# Patient Record
Sex: FEMALE | Race: OTHER | NOT HISPANIC OR LATINO | URBAN - METROPOLITAN AREA
[De-identification: names, ages, dates, MRNs, and addresses within clinical notes are randomized per-mention and may not be internally consistent; named-entity substitution may affect disease eponyms.]

---

## 2023-02-23 ENCOUNTER — EMERGENCY (EMERGENCY)
Facility: HOSPITAL | Age: 68
LOS: 1 days | Discharge: HOME | End: 2023-02-23
Attending: EMERGENCY MEDICINE
Payer: MEDICARE

## 2023-02-23 VITALS
DIASTOLIC BLOOD PRESSURE: 72 MMHG | HEART RATE: 86 BPM | RESPIRATION RATE: 18 BRPM | TEMPERATURE: 99 F | SYSTOLIC BLOOD PRESSURE: 121 MMHG | OXYGEN SATURATION: 96 % | WEIGHT: 100.09 LBS

## 2023-02-23 VITALS
SYSTOLIC BLOOD PRESSURE: 131 MMHG | OXYGEN SATURATION: 99 % | DIASTOLIC BLOOD PRESSURE: 83 MMHG | TEMPERATURE: 99 F | HEART RATE: 82 BPM | RESPIRATION RATE: 18 BRPM

## 2023-02-23 PROCEDURE — 73562 X-RAY EXAM OF KNEE 3: CPT

## 2023-02-23 PROCEDURE — 99283 EMERGENCY DEPT VISIT LOW MDM: CPT

## 2023-02-23 PROCEDURE — 99284 EMERGENCY DEPT VISIT MOD MDM: CPT

## 2023-02-23 PROCEDURE — 73562 X-RAY EXAM OF KNEE 3: CPT | Mod: 26,RT

## 2023-02-23 RX ORDER — IBUPROFEN 200 MG
600 TABLET ORAL ONCE
Refills: 0 | Status: COMPLETED | OUTPATIENT
Start: 2023-02-23 | End: 2023-02-23

## 2023-02-23 RX ADMIN — Medication 600 MILLIGRAM(S): at 16:14

## 2023-02-23 NOTE — ED PROVIDER NOTE - PHYSICAL EXAMINATION
Exam:  General: Patient well appearing, vital signs within normal limits  HENT: airway patent with moist mucous membranes, slight bruising over L maxilla without palpable deformity  Eye: EOMI  Cardiac: RRR S1/S2 with strong peripheral pulses  Respiratory: lungs clear without respiratory distress  GI: abdomen soft, non tender, non distended  Neuro: no gross neurologic deficits, strength and sensation intact  MSK: R knee bruising with effusion, ROM slightly decreased , no obvious deformity, no other noted extremity injuries  Skin: warm, well perfused  Psych: normal mood and affect

## 2023-02-23 NOTE — ED PROVIDER NOTE - OBJECTIVE STATEMENT
Patient presenting complaining of right knee pain after mechanical fall.  She was a crossing the street when she tripped and fell forward catching her weight on her knees.  She did strike her left cheek lightly and is having some pain over a bruise over her cheekbone.  She does not have any loss of consciousness and is not on any blood thinners.  She was able to stand and ambulate with assistance but is having trouble bearing weight on her right knee.  She is denying any visual changes any sudden severe headaches any nauseousness or vomiting any numbness tingling or weakness.  She has no pain in her upper extremities chest abdomen pelvis or right lower extremity.

## 2023-02-23 NOTE — ED PROVIDER NOTE - NSFOLLOWUPINSTRUCTIONS_ED_ALL_ED_FT
Thank you for choosing [facility] for your health care.    You were seen in the emergency room for right knee pain after a fall and found to have a fracture of your kneecap.  The mechanism of your kneecap was still intact on our exam here which is a good sign.  We placed your leg in a knee immobilizer and gave you crutches to help ambulate.  You can continue taking Tylenol and ibuprofen at home for pain and apply ice to the painful area for 15 minutes every 2 hours.  Please call the provided phone number to follow-up with orthopedics within the next week to get rechecked and to help guide you further healing process.  Please return to the emergency room for any further concerning or emergent medical issues.

## 2023-02-23 NOTE — ED PROVIDER NOTE - PATIENT PORTAL LINK FT
You can access the FollowMyHealth Patient Portal offered by [facility] by registering at the following website: [email or web address]. By joining [employer]’s FollowMyHealth portal, you will also be able to view your health information using other applications (apps) compatible with our system.

## 2023-02-23 NOTE — ED PROVIDER NOTE - CLINICAL SUMMARY MEDICAL DECISION MAKING FREE TEXT BOX
Patient presenting with right knee effusion after fall.  Will obtain x-rays to screen for fracture and treat with ibuprofen for pain.  She has a minuscule bruise to her left maxilla and my suspicion for an underlying facial fracture is very low.  She is neurologically intact and not on blood thinners so my suspicion for a significant bleed or closed head injury requiring imaging at this time is also very low.  Based off of this I do not believe that she needs a CT head or maxillofacial CT in the emergency department at this time.

## 2023-02-23 NOTE — ED PROVIDER NOTE - NSFOLLOWUPCLINICS_GEN_ALL_ED_FT
[facility]  Orthopedics  [address] [place] [address]  Phone: [phone]  Fax: [phone]  Follow Up Time: 4-6 Days

## 2023-02-23 NOTE — ED ADULT NURSE NOTE - OBJECTIVE STATEMENT
Pt is here for fall. Pt stated she was tripped by the object on the street and fell. Pt denied LOC and blood thinner usage. Right knee ecchymosis noted. AAOx4. GCS15.

## 2023-02-23 NOTE — ED PROVIDER NOTE - PROGRESS NOTE DETAILS
Patient XR with patellar fracture on my review.  Able to straight leg raise against gravity so mechanism intact.  Will place in knee immobilizer and crutches and discharge with orthopedics follow up.